# Patient Record
Sex: FEMALE | Race: AMERICAN INDIAN OR ALASKA NATIVE
[De-identification: names, ages, dates, MRNs, and addresses within clinical notes are randomized per-mention and may not be internally consistent; named-entity substitution may affect disease eponyms.]

---

## 2017-05-23 ENCOUNTER — HOSPITAL ENCOUNTER (EMERGENCY)
Dept: HOSPITAL 5 - ED | Age: 27
LOS: 1 days | Discharge: HOME | End: 2017-05-24
Payer: SELF-PAY

## 2017-05-23 DIAGNOSIS — J45.909: Primary | ICD-10-CM

## 2017-05-23 PROCEDURE — 84703 CHORIONIC GONADOTROPIN ASSAY: CPT

## 2017-05-23 PROCEDURE — 99283 EMERGENCY DEPT VISIT LOW MDM: CPT

## 2017-05-23 PROCEDURE — 36415 COLL VENOUS BLD VENIPUNCTURE: CPT

## 2017-05-24 VITALS — DIASTOLIC BLOOD PRESSURE: 90 MMHG | SYSTOLIC BLOOD PRESSURE: 148 MMHG

## 2017-05-24 NOTE — EMERGENCY DEPARTMENT REPORT
HPI





- General


Chief Complaint: Adult Asthma


Time Seen by Provider: 05/24/17 00:11





- HPI


HPI: 


6-year-old female presents to ED complaining of eczema since the patient 

started earlier today.  Patient states she usually has allergies and allergies 

10 to flareup asthma.  Patient states mild coughing intermittently today.  

Patient states to having 2 inhalers at home when she uses as needed.  Patient 

also states she has not had a menstrual period since March 11 and was worried.


Neck patient denies fevers / chills/nausea/vomiting/shortness of breath/

dizziness/runny nose/chest pain/abdominal pain








ED Past Medical Hx





- Past Medical History


Previous Medical History?: Yes


Hx Asthma: Yes





- Surgical History


Past Surgical History?: No





- Social History


Smoking Status: Never Smoker


Substance Use Type: None





- Medications


Home Medications: 


 Home Medications











 Medication  Instructions  Recorded  Confirmed  Last Taken  Type


 


ALBUTEROL Inhaler [Proair] 2 puff IH QID PRN 04/23/14 04/23/14 04/23/14 History


 


Albuterol Sulfate [Ventolin HFA] 2 puff IH Q4H PRN #1 hfa.aer.ad 04/23/14  

Unknown Rx


 


Fluticasone/Salmeterol [Advair 1 puff IH BID 04/23/14 04/23/14 04/23/14 History





Diskus 100-50 mcg]     


 


Loratadine [Claritin] 10 mg PO DAILY #30 tablet 04/23/14  Unknown Rx


 


Doxycycline [Vibramycin CAP] 100 mg PO BID #20 capsule 08/20/14  Unknown Rx


 


HYDROcodone/ACETAMINOPHEN [Moira 1 each PO Q6HR #20 tablet 08/20/14  Unknown Rx





5/325 Tablet]     


 


Ibuprofen [Motrin] 600 mg PO Q8H PRN #60 tablet 08/20/14  Unknown Rx


 


Ibuprofen [Motrin] 800 mg PO Q8HR PRN #30 tablet 11/29/15  Unknown Rx


 


Ondansetron [Zofran Odt] 4 mg PO Q4H PRN #10 tab.rapdis 11/29/15  Unknown Rx


 


Sulfamethoxazole/Trimethoprim 1 each PO BID #14 tablet 11/29/15  Unknown Rx





[Bactrim DS TAB]     


 


traMADol [Ultram] 50 mg PO Q6HR PRN #14 tablet 11/29/15  Unknown Rx


 


Cetirizine HCl [ZyrTEC] 10 mg PO DAILY #30 capsule 05/24/17  Unknown Rx


 


predniSONE [Deltasone] 20 mg PO TID #5 tab 05/24/17  Unknown Rx














ED Review of Systems


ROS: 


Stated complaint: RUFINA/PREG/UNKNOWN WKS


Other details as noted in HPI





Constitutional: denies: chills, fever


Eyes: denies: eye pain, eye discharge, vision change


ENT: denies: ear pain, throat pain


Respiratory: denies: cough, shortness of breath, wheezing


Cardiovascular: denies: chest pain, palpitations


Endocrine: no symptoms reported


Gastrointestinal: denies: abdominal pain, nausea, diarrhea


Genitourinary: denies: urgency, dysuria, discharge


Musculoskeletal: denies: back pain, joint swelling, arthralgia


Skin: denies: rash, lesions


Neurological: denies: headache, weakness, paresthesias


Psychiatric: denies: anxiety, depression


Hematological/Lymphatic: denies: easy bleeding, easy bruising





Physical Exam





- Physical Exam


Vital Signs: 


 Vital Signs











  05/23/17





  19:53


 


Temperature 98.7 F


 


Pulse Rate 76


 


Respiratory 18





Rate 


 


Blood Pressure 123/70


 


O2 Sat by Pulse 100





Oximetry 











Physical Exam: 





GENERAL: Alert and oriented x3, no apparent distress, Normal Gait, atraumatic.


HEAD: Head is normocephalic and a-traumatic.





NOSE: Nose symetrical, Nontender,Nares appeared normal.


MOUTH:Mouth is well hydrated and without lesions. Tonsils nonerythematous or 

swollen,  Uvula midline, Tongue not elevated. Mucous membranes are moist. 

Posterior pharynx clear, no exudate or lesions. Patent airways.


NECK: Supple. Non edematous, No carotid bruits.  No lymphadenopathy or 

thyromegaly.  No C-spine tenderness


LUNGS: Symetrical with respiration, No wheezing, no rales or crackles, CTAB.


HEART:  S1, S2 present, regular rate and rhythm without murmur, no rubs, no 

gallops.


.


SKIN:  Warm and dry, No lesions, No ulceration or induration present.











ED Course


 Vital Signs











  05/23/17





  19:53


 


Temperature 98.7 F


 


Pulse Rate 76


 


Respiratory 18





Rate 


 


Blood Pressure 123/70


 


O2 Sat by Pulse 100





Oximetry 














ED Medical Decision Making





- Medical Decision Making


6-year-old female presents to allergic bronchitis


Course: Patient received albuterol treatment in ED.


Patient reports feeling much better.


Vital signs are normal patient is in no acute or respiratory distress.


No wheezing normal lung exam.


Patient states she will follow up with primary care physician.


Pregnancy test was negative discussed this findings the patient.  Discussed 

follow-up with GYN





Critical care attestation.: 


If time is entered above; I have spent that time in minutes in the direct care 

of this critically ill patient, excluding procedure time.








ED Disposition


Clinical Impression: 


 Allergic bronchitis without complication





Disposition: DISCHARGED TO HOME OR SELFCARE


Is pt being admited?: No


Does the pt Need Aspirin: No


Condition: Stable


Instructions:  Chronic Bronchitis (ED), Asthma (ED), Allergies (ED)


Prescriptions: 


Cetirizine HCl [ZyrTEC] 10 mg PO DAILY #30 capsule


predniSONE [Deltasone] 20 mg PO TID #5 tab


Referrals: 


PRIMARY CARE,MD [Primary Care Provider] - 3-5 Days


[facility] [Outside] - 3-5 Days


[facility] [Outside] - 3-5 Days


[facility] [Outside] - 3-5 Days


MEE RODRIGUEZ MD [Referring] - 3-5 Days


Forms:  Accompanied Note, Work/School Release Form(ED)


Time of Disposition: 00:40

## 2017-07-22 ENCOUNTER — HOSPITAL ENCOUNTER (EMERGENCY)
Dept: HOSPITAL 5 - ED | Age: 27
LOS: 1 days | Discharge: LEFT BEFORE DISCHARGE | End: 2017-07-23
Payer: SELF-PAY

## 2017-07-22 VITALS — SYSTOLIC BLOOD PRESSURE: 142 MMHG | DIASTOLIC BLOOD PRESSURE: 73 MMHG

## 2017-07-22 DIAGNOSIS — Z53.21: ICD-10-CM

## 2017-07-22 DIAGNOSIS — R51: Primary | ICD-10-CM

## 2017-07-22 PROCEDURE — 80048 BASIC METABOLIC PNL TOTAL CA: CPT

## 2017-07-22 PROCEDURE — 93010 ELECTROCARDIOGRAM REPORT: CPT

## 2017-07-22 PROCEDURE — 93005 ELECTROCARDIOGRAM TRACING: CPT

## 2017-07-22 PROCEDURE — 82805 BLOOD GASES W/O2 SATURATION: CPT

## 2017-07-22 PROCEDURE — 85025 COMPLETE CBC W/AUTO DIFF WBC: CPT

## 2017-07-22 PROCEDURE — 36415 COLL VENOUS BLD VENIPUNCTURE: CPT

## 2017-07-22 PROCEDURE — 85610 PROTHROMBIN TIME: CPT

## 2017-07-22 PROCEDURE — 84703 CHORIONIC GONADOTROPIN ASSAY: CPT

## 2017-07-22 PROCEDURE — 84484 ASSAY OF TROPONIN QUANT: CPT

## 2017-07-22 PROCEDURE — 85730 THROMBOPLASTIN TIME PARTIAL: CPT

## 2017-07-22 PROCEDURE — 71020: CPT

## 2017-07-22 PROCEDURE — 83735 ASSAY OF MAGNESIUM: CPT

## 2017-07-22 PROCEDURE — 83880 ASSAY OF NATRIURETIC PEPTIDE: CPT

## 2017-07-22 PROCEDURE — 82140 ASSAY OF AMMONIA: CPT

## 2017-07-23 LAB
ANION GAP SERPL CALC-SCNC: 17 MMOL/L
APTT BLD: 29 SEC. (ref 24.2–36.6)
BASOPHILS NFR BLD AUTO: 0.4 % (ref 0–1.8)
BUN SERPL-MCNC: 10 MG/DL (ref 7–17)
BUN/CREAT SERPL: 14.28 %
CALCIUM SERPL-MCNC: 9.1 MG/DL (ref 8.4–10.2)
CHLORIDE SERPL-SCNC: 101.7 MMOL/L (ref 98–107)
CO2 SERPL-SCNC: 25 MMOL/L (ref 22–30)
EOSINOPHIL NFR BLD AUTO: 1.8 % (ref 0–4.3)
GLUCOSE SERPL-MCNC: 84 MG/DL (ref 65–100)
HCT VFR BLD CALC: 39.7 % (ref 30.3–42.9)
HGB BLD-MCNC: 13 GM/DL (ref 10.1–14.3)
INR PPP: 0.92 (ref 0.87–1.13)
MAGNESIUM SERPL-MCNC: 2.1 MG/DL (ref 1.7–2.3)
MCH RBC QN AUTO: 32 PG (ref 28–32)
MCHC RBC AUTO-ENTMCNC: 33 % (ref 30–34)
MCV RBC AUTO: 98 FL (ref 79–97)
PLATELET # BLD: 275 K/MM3 (ref 140–440)
POTASSIUM SERPL-SCNC: 3.8 MMOL/L (ref 3.6–5)
RBC # BLD AUTO: 4.07 M/MM3 (ref 3.65–5.03)
SODIUM SERPL-SCNC: 140 MMOL/L (ref 137–145)
WBC # BLD AUTO: 6.5 K/MM3 (ref 4.5–11)

## 2017-07-23 NOTE — XRAY REPORT
Chest 2 views:



History: Shortness of breath.



Findings:



Normal cardiomediastinal silhouette the trachea is midline. No 

consolidation, pneumothorax or pleural effusion.



Impression:



No acute cardiopulmonary findings.

## 2021-11-10 ENCOUNTER — APPOINTMENT (RX ONLY)
Dept: URBAN - METROPOLITAN AREA CLINIC 50 | Facility: CLINIC | Age: 31
Setting detail: DERMATOLOGY
End: 2021-11-10

## 2021-11-10 DIAGNOSIS — M30.0 POLYARTERITIS NODOSA: ICD-10-CM

## 2021-11-10 DIAGNOSIS — L20.89 OTHER ATOPIC DERMATITIS: ICD-10-CM

## 2021-11-10 PROBLEM — L30.9 DERMATITIS, UNSPECIFIED: Status: ACTIVE | Noted: 2021-11-10

## 2021-11-10 PROBLEM — L20.84 INTRINSIC (ALLERGIC) ECZEMA: Status: ACTIVE | Noted: 2021-11-10

## 2021-11-10 PROCEDURE — 11105 PUNCH BX SKIN EA SEP/ADDL: CPT

## 2021-11-10 PROCEDURE — ? TREATMENT REGIMEN

## 2021-11-10 PROCEDURE — 11104 PUNCH BX SKIN SINGLE LESION: CPT

## 2021-11-10 PROCEDURE — ? COUNSELING

## 2021-11-10 PROCEDURE — ? BIOPSY BY PUNCH METHOD

## 2021-11-10 PROCEDURE — 99203 OFFICE O/P NEW LOW 30 MIN: CPT | Mod: 25

## 2021-11-10 PROCEDURE — ? PRESCRIPTION

## 2021-11-10 RX ORDER — CLOBETASOL PROPIONATE 0.5 MG/G
THIN LAYER OINTMENT TOPICAL BID
Qty: 60 | Refills: 1 | Status: ERX | COMMUNITY
Start: 2021-11-10

## 2021-11-10 RX ORDER — HYDROCORTISONE 25 MG/G
THIN LAYER CREAM TOPICAL BID
Qty: 454 | Refills: 1 | Status: ERX | COMMUNITY
Start: 2021-11-10

## 2021-11-10 RX ADMIN — HYDROCORTISONE THIN LAYER: 25 CREAM TOPICAL at 00:00

## 2021-11-10 RX ADMIN — CLOBETASOL PROPIONATE THIN LAYER: 0.5 OINTMENT TOPICAL at 00:00

## 2021-11-10 ASSESSMENT — LOCATION ZONE DERM
LOCATION ZONE: ARM
LOCATION ZONE: FACE
LOCATION ZONE: LEG

## 2021-11-10 ASSESSMENT — LOCATION SIMPLE DESCRIPTION DERM
LOCATION SIMPLE: RIGHT PRETIBIAL REGION
LOCATION SIMPLE: LEFT PRETIBIAL REGION
LOCATION SIMPLE: RIGHT FOREARM
LOCATION SIMPLE: RIGHT CHEEK

## 2021-11-10 ASSESSMENT — LOCATION DETAILED DESCRIPTION DERM
LOCATION DETAILED: RIGHT PROXIMAL DORSAL FOREARM
LOCATION DETAILED: RIGHT INFERIOR CENTRAL MALAR CHEEK
LOCATION DETAILED: LEFT DISTAL PRETIBIAL REGION
LOCATION DETAILED: RIGHT DISTAL PRETIBIAL REGION

## 2021-11-10 NOTE — HPI: RASH
What Type Of Note Output Would You Prefer (Optional)?: Standard Output
Is The Patient Presenting As Previously Scheduled?: Yes
How Severe Is Your Rash?: moderate
Is This A New Presentation, Or A Follow-Up?: Rash
Additional History: Patient states her eczema is flaring. Patient was treated in the past with a topical but can’t remember the name.

## 2021-11-10 NOTE — HPI: DISCOLORATION
How Severe Is Your Skin Discoloration?: moderate
Additional History: Patient reports along with discoloration there is a lot of pain. Patient states she had an ultrasound done in May of this year which came back normal. Patient was treated by pcp with an oral medication which made it better but came back. Patient states this has been going on since 2020.

## 2021-11-10 NOTE — PROCEDURE: BIOPSY BY PUNCH METHOD
Detail Level: Detailed
Was A Bandage Applied: Yes
Punch Size In Mm: 4
Biopsy Type: H and E
Anesthesia Type: 1% lidocaine with epinephrine
Anesthesia Volume In Cc: 0.5
Additional Anesthesia Volume In Cc (Will Not Render If 0): 0
Hemostasis: None
Epidermal Sutures: 4-0 Ethilon
Wound Care: Petrolatum
Dressing: bandage
Suture Removal: 14 days
Patient Will Remove Sutures At Home?: No
Lab: -[id number]
Consent: Written consent was obtained and risks were reviewed including but not limited to scarring, infection, bleeding, scabbing, incomplete removal, nerve damage and allergy to anesthesia.
Post-Care Instructions: I reviewed with the patient in detail post-care instructions. Patient is to keep the biopsy site dry overnight, and then apply bacitracin twice daily until healed. Patient may apply hydrogen peroxide soaks to remove any crusting.
Home Suture Removal Text: Patient was provided a home suture removal kit and will remove their sutures at home.  If they have any questions or difficulties they will call the office.
Notification Instructions: Patient will be notified of biopsy results. However, patient instructed to call the office if not contacted within 2 weeks.
Billing Type: Third-Party Bill
Information: Selecting Yes will display possible errors in your note based on the variables you have selected. This validation is only offered as a suggestion for you. PLEASE NOTE THAT THE VALIDATION TEXT WILL BE REMOVED WHEN YOU FINALIZE YOUR NOTE. IF YOU WANT TO FAX A PRELIMINARY NOTE YOU WILL NEED TO TOGGLE THIS TO 'NO' IF YOU DO NOT WANT IT IN YOUR FAXED NOTE.

## 2021-11-10 NOTE — PROCEDURE: TREATMENT REGIMEN
Otc Regimen: Dove scented bar soap
Discontinue Regimen: Dial soap \\nBath [employer] body works soap
Detail Level: Zone
Initiate Treatment: CLOBETASOL 0.05% ointment twice daily to arm x two weeks then once daily x one week. Repeat as needed for flares \\nHydrocortisone 2.5% cream to face twice daily x two weeks then once daily x one week. Repeat as needed flares

## 2021-11-10 NOTE — PROCEDURE: MIPS QUALITY
Quality 110: Preventive Care And Screening: Influenza Immunization: Influenza Immunization not Administered for Documented Reasons.
Additional Notes: No reason given.
Detail Level: Detailed

## 2021-11-24 ENCOUNTER — APPOINTMENT (RX ONLY)
Dept: URBAN - METROPOLITAN AREA CLINIC 50 | Facility: CLINIC | Age: 31
Setting detail: DERMATOLOGY
End: 2021-11-24

## 2021-11-24 DIAGNOSIS — M79.3 PANNICULITIS, UNSPECIFIED: ICD-10-CM

## 2021-11-24 DIAGNOSIS — Z48.02 ENCOUNTER FOR REMOVAL OF SUTURES: ICD-10-CM

## 2021-11-24 PROBLEM — I83.10 VARICOSE VEINS OF UNSPECIFIED LOWER EXTREMITY WITH INFLAMMATION: Status: ACTIVE | Noted: 2021-11-24

## 2021-11-24 PROCEDURE — ? TREATMENT REGIMEN

## 2021-11-24 PROCEDURE — 99213 OFFICE O/P EST LOW 20 MIN: CPT

## 2021-11-24 PROCEDURE — ? COUNSELING

## 2021-11-24 PROCEDURE — ? SUTURE REMOVAL (NO GLOBAL PERIOD)

## 2021-11-24 ASSESSMENT — LOCATION DETAILED DESCRIPTION DERM
LOCATION DETAILED: RIGHT PROXIMAL PRETIBIAL REGION
LOCATION DETAILED: LEFT DISTAL PRETIBIAL REGION
LOCATION DETAILED: RIGHT MEDIAL DISTAL PRETIBIAL REGION
LOCATION DETAILED: LEFT PROXIMAL PRETIBIAL REGION

## 2021-11-24 ASSESSMENT — LOCATION ZONE DERM: LOCATION ZONE: LEG

## 2021-11-24 ASSESSMENT — LOCATION SIMPLE DESCRIPTION DERM
LOCATION SIMPLE: RIGHT PRETIBIAL REGION
LOCATION SIMPLE: LEFT PRETIBIAL REGION

## 2021-11-24 NOTE — PROCEDURE: TREATMENT REGIMEN
Plan: Strongly advised patient continue to work on losing weight. Recommended staying active, such as walking to help circulation. Patient reports she sees someone for her diabetes and also sees cardiologist on a regular basis. Patient is currently being referred to a new pcp doctor, Henrique Wright MD. Recommended patient discuss oral medication with pcp. Also offered ILK injections to help with pain if it comes back.
Detail Level: Zone
Otc Regimen: Compression stockings

## 2021-11-24 NOTE — PROCEDURE: MIPS QUALITY
Detail Level: Detailed
Additional Notes: No reason given.
Quality 110: Preventive Care And Screening: Influenza Immunization: Influenza Immunization not Administered for Documented Reasons.

## 2022-05-15 ENCOUNTER — HOSPITAL ENCOUNTER (EMERGENCY)
Dept: HOSPITAL 5 - ED | Age: 32
Discharge: HOME | End: 2022-05-15
Payer: SELF-PAY

## 2022-05-15 VITALS — SYSTOLIC BLOOD PRESSURE: 134 MMHG | DIASTOLIC BLOOD PRESSURE: 92 MMHG

## 2022-05-15 DIAGNOSIS — G43.909: ICD-10-CM

## 2022-05-15 DIAGNOSIS — R07.89: Primary | ICD-10-CM

## 2022-05-15 DIAGNOSIS — E11.9: ICD-10-CM

## 2022-05-15 DIAGNOSIS — Z91.010: ICD-10-CM

## 2022-05-15 DIAGNOSIS — Z79.899: ICD-10-CM

## 2022-05-15 DIAGNOSIS — J45.909: ICD-10-CM

## 2022-05-15 LAB
BUN SERPL-MCNC: 11 MG/DL (ref 7–17)
BUN/CREAT SERPL: 14 %
CALCIUM SERPL-MCNC: 9.2 MG/DL (ref 8.4–10.2)
HCT VFR BLD CALC: 37.1 % (ref 30.3–42.9)
HEMOLYSIS INDEX: 4
HGB BLD-MCNC: 12.3 GM/DL (ref 10.1–14.3)
MCHC RBC AUTO-ENTMCNC: 33 % (ref 30–34)
MCV RBC AUTO: 93 FL (ref 79–97)
PLATELET # BLD: 277 K/MM3 (ref 140–440)
RBC # BLD AUTO: 3.99 M/MM3 (ref 3.65–5.03)

## 2022-05-15 PROCEDURE — 82550 ASSAY OF CK (CPK): CPT

## 2022-05-15 PROCEDURE — 85027 COMPLETE CBC AUTOMATED: CPT

## 2022-05-15 PROCEDURE — 93005 ELECTROCARDIOGRAM TRACING: CPT

## 2022-05-15 PROCEDURE — 71045 X-RAY EXAM CHEST 1 VIEW: CPT

## 2022-05-15 PROCEDURE — 82553 CREATINE MB FRACTION: CPT

## 2022-05-15 PROCEDURE — 36415 COLL VENOUS BLD VENIPUNCTURE: CPT

## 2022-05-15 PROCEDURE — 80048 BASIC METABOLIC PNL TOTAL CA: CPT

## 2022-05-15 PROCEDURE — 84484 ASSAY OF TROPONIN QUANT: CPT

## 2022-05-15 PROCEDURE — 99284 EMERGENCY DEPT VISIT MOD MDM: CPT

## 2022-05-15 PROCEDURE — 96375 TX/PRO/DX INJ NEW DRUG ADDON: CPT

## 2022-05-15 PROCEDURE — 96374 THER/PROPH/DIAG INJ IV PUSH: CPT

## 2022-05-15 NOTE — EMERGENCY DEPARTMENT REPORT
ED General Adult HPI





- General


Chief complaint: Chest Pain


Stated complaint: CHEST PAIN


PUI?: No


Time Seen by Provider: 05/15/22 19:32


Source: patient, EMS


Mode of arrival: Stretcher


Limitations: No Limitations





- History of Present Illness


Initial comments: 


31 year old obese male with history of diabetes mellitus came in today with 

concern of chest pain about 1 hour prior to arrival while driving.  States the 

chest pain is 'sharp' sensation and non-radiating and nothing makes it better or

worse such as exertion.  Patient denies any other associated symptoms.  Denies 

smoking history, cocain use, or family with myocardial infarction <65 year old. 

Patient states she has history of migraine and currently have mild headache with

photophobia; no phonophobia. 





Patient currently denies fever chill night sweat dizziness blurred vision 

lightheadedness tinnitus ear pain runny nose sore throat loss of taste loss of 

smell palpitation short of breath cough abdominal pain nausea vomiting diarrhea 

constipation joint pain muscle pain new rash and heat or cold intolerance.





- Related Data


                                Home Medications











 Medication  Instructions  Recorded  Confirmed  Last Taken


 


Albuterol Mdi (or & Nicu Only) 2 puff IH QID PRN 04/23/14 04/23/14 04/23/14





[Proair]    


 


Fluticasone/Salmeterol [Advair 1 puff IH BID 04/23/14 04/23/14 04/23/14





Diskus 100-50 mcg]    








                                  Previous Rx's











 Medication  Instructions  Recorded  Last Taken  Type


 


Albuterol Sulfate [Ventolin HFA] 2 puff IH Q4H PRN #1 hfa.aer.ad 04/23/14 

Unknown Rx


 


Loratadine (Nf) [Claritin] 10 mg PO DAILY #30 tablet 04/23/14 Unknown Rx


 


DOXYCYCLINE Hyclate [Vibramycin 100 mg PO BID #20 capsule 08/20/14 Unknown Rx





CAP]    


 


HYDROcodone/ACETAMINOPHEN [Calhan 1 each PO Q6HR #20 tablet 08/20/14 Unknown Rx





5/325 Tablet]    


 


Ibuprofen [Motrin] 600 mg PO Q8H PRN #60 tablet 08/20/14 Unknown Rx


 


Ibuprofen [Motrin] 800 mg PO Q8HR PRN #30 tablet 11/29/15 Unknown Rx


 


Ondansetron [Zofran Odt] 4 mg PO Q4H PRN #10 tab.rapdis 11/29/15 Unknown Rx


 


Sulfamethoxazole/Trimethoprim 1 each PO BID #14 tablet 11/29/15 Unknown Rx





[Bactrim DS TAB]    


 


traMADoL [Ultram] 50 mg PO Q6HR PRN #14 tablet 11/29/15 Unknown Rx


 


Cetirizine HCl [ZyrTEC] 10 mg PO DAILY #30 capsule 05/24/17 Unknown Rx


 


predniSONE [Deltasone] 20 mg PO TID #5 tab 05/24/17 Unknown Rx











                                    Allergies











Allergy/AdvReac Type Severity Reaction Status Date / Time


 


peanut AdvReac  Shortness Verified 11/28/15 21:45





   of Breath  














ED Review of Systems


ROS: 


Stated complaint: CHEST PAIN


Other details as noted in HPI





Comment: All other systems reviewed and negative


Constitutional: no symptoms reported, see HPI


Eyes: as per HPI


ENT: as per HPI


Respiratory: no symptoms reported, see HPI


Cardiovascular: chest pain (localized midsternal sharp).  denies: palpitations, 

dyspnea on exertion, orthopnea, edema, syncope, paroxysmal nocturnal dyspnea


Endocrine: no symptoms reported, see HPI


Gastrointestinal: as per HPI, abdominal pain


Genitourinary: as per HPI


Musculoskeletal: as per HPI


Skin: as per HPI


Neurological: as per HPI


Psychiatric: as per HPI


Hematological/Lymphatic: as per HPI





ED Past Medical Hx





- Past Medical History


Previous Medical History?: Yes


Hx Diabetes: Yes


Hx Headaches / Migraines: Yes


Hx Asthma: Yes


Additional medical history: obesity





- Surgical History


Past Surgical History?: No





- Social History


Smoking Status: Never Smoker


Substance Use Type: None





- Medications


Home Medications: 


                                Home Medications











 Medication  Instructions  Recorded  Confirmed  Last Taken  Type


 


Albuterol Mdi (or & Nicu Only) 2 puff IH QID PRN 04/23/14 04/23/14 04/23/14 

History





[Proair]     


 


Albuterol Sulfate [Ventolin HFA] 2 puff IH Q4H PRN #1 hfa.aer.ad 04/23/14  

Unknown Rx


 


Fluticasone/Salmeterol [Advair 1 puff IH BID 04/23/14 04/23/14 04/23/14 History





Diskus 100-50 mcg]     


 


Loratadine (Nf) [Claritin] 10 mg PO DAILY #30 tablet 04/23/14  Unknown Rx


 


DOXYCYCLINE Hyclate [Vibramycin 100 mg PO BID #20 capsule 08/20/14  Unknown Rx





CAP]     


 


HYDROcodone/ACETAMINOPHEN [Calhan 1 each PO Q6HR #20 tablet 08/20/14  Unknown Rx





5/325 Tablet]     


 


Ibuprofen [Motrin] 600 mg PO Q8H PRN #60 tablet 08/20/14  Unknown Rx


 


Ibuprofen [Motrin] 800 mg PO Q8HR PRN #30 tablet 11/29/15  Unknown Rx


 


Ondansetron [Zofran Odt] 4 mg PO Q4H PRN #10 tab.rapdis 11/29/15  Unknown Rx


 


Sulfamethoxazole/Trimethoprim 1 each PO BID #14 tablet 11/29/15  Unknown Rx





[Bactrim DS TAB]     


 


traMADoL [Ultram] 50 mg PO Q6HR PRN #14 tablet 11/29/15  Unknown Rx


 


Cetirizine HCl [ZyrTEC] 10 mg PO DAILY #30 capsule 05/24/17  Unknown Rx


 


predniSONE [Deltasone] 20 mg PO TID #5 tab 05/24/17  Unknown Rx














ED Physical Exam





- General


Limitations: No Limitations


General appearance: alert, in no apparent distress





- Head


Head exam: Present: atraumatic, normocephalic, normal inspection





ED Course


                                   Vital Signs











  05/15/22





  19:16


 


Temperature 98.3 F


 


Pulse Rate 88


 


Respiratory 18





Rate 


 


Blood Pressure 134/92


 


O2 Sat by Pulse 97





Oximetry 














ED Medical Decision Making





- Lab Data


Result diagrams: 


                                 05/15/22 20:06





                                 05/15/22 20:06





- Medical Decision Making





TROPONIN NEGATIVE AND HEART SCORE OF 1 FOR RISK FACTOR (DM/OBESITY); INFORMED TO

 FOLLOW UP WITH PCP WITHIN 3 DAYS; RETURN IF ANY WORSENING OF SYMPTOMS OR NEW 

SYMPTOMS. 


Critical care attestation.: 


If time is entered above; I have spent that time in minutes in the direct care 

of this critically ill patient, excluding procedure time.








ED Disposition


Clinical Impression: 


 Non-cardiac chest pain, Migraine





Disposition: 01 HOME / SELF CARE / HOMELESS


Is pt being admited?: No


Does the pt Need Aspirin: No


Condition: Stable


Instructions:  Nonspecific Chest Pain, Adult


Time of Disposition: 20:53

## 2022-05-15 NOTE — XRAY REPORT
CHEST 1 VIEW



INDICATION / CLINICAL INFORMATION: chest pain.



COMPARISON: 7/23/2017



FINDINGS:



SUPPORT DEVICES: None.

HEART / MEDIASTINUM: No significant abnormality. 

LUNGS / PLEURA: No significant pulmonary or pleural abnormality. No pneumothorax. 



ADDITIONAL FINDINGS: No significant additional findings.



IMPRESSION:

1. No acute findings.



Signer Name: Hermann Clay MD 

Signed: 5/15/2022 8:20 PM

Workstation Name: [id number]

## 2022-05-18 NOTE — ELECTROCARDIOGRAPH REPORT
[facility]

                                       

Test Date:    2022-05-15               Test Time:    19:54:14

Pat Name:     VISHAL HOLLINS            Department:   

Patient ID:   [id number]          Room:          

Gender:       F                        Technician:   LAWANDA

:          1990               Requested By: POLI DIAMOND

Order Number: [id number]              Reading MD:   Sangita Bowman

                                 Measurements

Intervals                              Axis          

Rate:         79                       P:            17

PA:           167                      QRS:          20

QRSD:         82                       T:            3

QT:           387                                    

QTc:          443                                    

                           Interpretive Statements

Sinus rhythm

No previous ECG available for comparison

Electronically Signed On 2022 10:02:43 EDT by Sangita Bowman